# Patient Record
Sex: FEMALE | Race: WHITE | NOT HISPANIC OR LATINO | URBAN - METROPOLITAN AREA
[De-identification: names, ages, dates, MRNs, and addresses within clinical notes are randomized per-mention and may not be internally consistent; named-entity substitution may affect disease eponyms.]

---

## 2017-12-27 ENCOUNTER — OFFICE VISIT - HEALTHEAST (OUTPATIENT)
Dept: FAMILY MEDICINE | Facility: CLINIC | Age: 77
End: 2017-12-27

## 2017-12-27 ENCOUNTER — RECORDS - HEALTHEAST (OUTPATIENT)
Dept: GENERAL RADIOLOGY | Facility: CLINIC | Age: 77
End: 2017-12-27

## 2017-12-27 DIAGNOSIS — J18.9 PNEUMONIA, UNSPECIFIED ORGANISM: ICD-10-CM

## 2017-12-27 DIAGNOSIS — J18.9 CAP (COMMUNITY ACQUIRED PNEUMONIA): ICD-10-CM

## 2017-12-27 ASSESSMENT — MIFFLIN-ST. JEOR: SCORE: 1135.5

## 2018-01-04 ENCOUNTER — OFFICE VISIT - HEALTHEAST (OUTPATIENT)
Dept: FAMILY MEDICINE | Facility: CLINIC | Age: 78
End: 2018-01-04

## 2018-01-04 DIAGNOSIS — J18.9 CAP (COMMUNITY ACQUIRED PNEUMONIA): ICD-10-CM

## 2018-01-04 ASSESSMENT — MIFFLIN-ST. JEOR: SCORE: 1140.32

## 2018-01-11 ENCOUNTER — OFFICE VISIT - HEALTHEAST (OUTPATIENT)
Dept: FAMILY MEDICINE | Facility: CLINIC | Age: 78
End: 2018-01-11

## 2018-01-11 DIAGNOSIS — J18.9 COMMUNITY ACQUIRED PNEUMONIA, UNSPECIFIED LATERALITY: ICD-10-CM

## 2018-01-11 ASSESSMENT — MIFFLIN-ST. JEOR: SCORE: 1140.32

## 2019-02-13 ENCOUNTER — OFFICE VISIT - HEALTHEAST (OUTPATIENT)
Dept: FAMILY MEDICINE | Facility: CLINIC | Age: 79
End: 2019-02-13

## 2019-02-13 DIAGNOSIS — S62.101A CLOSED FRACTURE OF RIGHT WRIST, INITIAL ENCOUNTER: ICD-10-CM

## 2019-02-13 DIAGNOSIS — T14.90XA INJURY: ICD-10-CM

## 2019-02-13 RX ORDER — TRAMADOL HYDROCHLORIDE 50 MG/1
50 TABLET ORAL EVERY 6 HOURS PRN
Qty: 20 TABLET | Refills: 0 | Status: SHIPPED | OUTPATIENT
Start: 2019-02-13

## 2019-02-20 ENCOUNTER — RECORDS - HEALTHEAST (OUTPATIENT)
Dept: GENERAL RADIOLOGY | Facility: CLINIC | Age: 79
End: 2019-02-20

## 2019-02-20 ENCOUNTER — OFFICE VISIT - HEALTHEAST (OUTPATIENT)
Dept: FAMILY MEDICINE | Facility: CLINIC | Age: 79
End: 2019-02-20

## 2019-02-20 DIAGNOSIS — Z09 ENCOUNTER FOR FOLLOW-UP EXAMINATION AFTER COMPLETED TREATMENT FOR CONDITIONS OTHER THAN MALIGNANT NEOPLASM: ICD-10-CM

## 2019-02-20 DIAGNOSIS — Z09 FOLLOW UP: ICD-10-CM

## 2019-03-06 ENCOUNTER — OFFICE VISIT - HEALTHEAST (OUTPATIENT)
Dept: FAMILY MEDICINE | Facility: CLINIC | Age: 79
End: 2019-03-06

## 2019-03-06 ENCOUNTER — COMMUNICATION - HEALTHEAST (OUTPATIENT)
Dept: FAMILY MEDICINE | Facility: CLINIC | Age: 79
End: 2019-03-06

## 2019-03-06 DIAGNOSIS — Z09 FOLLOW UP: ICD-10-CM

## 2019-03-20 ENCOUNTER — OFFICE VISIT - HEALTHEAST (OUTPATIENT)
Dept: FAMILY MEDICINE | Facility: CLINIC | Age: 79
End: 2019-03-20

## 2019-03-20 DIAGNOSIS — Z09 FOLLOW UP: ICD-10-CM

## 2019-03-27 ENCOUNTER — OFFICE VISIT - HEALTHEAST (OUTPATIENT)
Dept: FAMILY MEDICINE | Facility: CLINIC | Age: 79
End: 2019-03-27

## 2019-03-27 ENCOUNTER — RECORDS - HEALTHEAST (OUTPATIENT)
Dept: GENERAL RADIOLOGY | Facility: CLINIC | Age: 79
End: 2019-03-27

## 2019-03-27 DIAGNOSIS — S62.101S FRACTURE OF UNSPECIFIED CARPAL BONE, RIGHT WRIST, SEQUELA: ICD-10-CM

## 2019-03-27 DIAGNOSIS — S62.101S CLOSED FRACTURE OF RIGHT WRIST, SEQUELA: ICD-10-CM

## 2021-05-27 NOTE — PROGRESS NOTES
Chief Complaint   Patient presents with     Cast Removal       HPI: Very pleasant 78-year-old female presents today for recheck of her fracture.  No pain noted.    ROS: No distal paresthesias.  No vascular changes.    SH:    reports that she quit smoking about 20 years ago. Her smoking use included cigarettes. She has a 60.00 pack-year smoking history. she has never used smokeless tobacco. She reports that she drinks alcohol. She reports that she does not use drugs.      FH: The Patient's family history includes Allergic rhinitis in her mother; Asthma in her mother; Cancer in her father and mother; Other in her brother and sister.     Meds:  Meggan has a current medication list which includes the following prescription(s): albuterol, cholecalciferol (vitamin d3), fluticasone propionate, ibuprofen, inhalational spacing device, loratadine, moxifloxacin, ranitidine, and tramadol, and the following Facility-Administered Medications: ceftriaxone.    O:  There were no vitals taken for this visit.  Examination shows that the cast was removed and there is normal distal neurological vascular function.  No pain to palpation over the fracture site.    Review the x-ray shows good callus formation with no change in position from previous x-rays.    A/P:   1. Closed fracture of right wrist, sequela  Fracture healing well.  We will place her in a soft splint for comfort, and she may use it minimally for the next week around the house but no heavy lifting.  She then may gradually increase activity.  Over-the-counter medicines only for pain.  - XR Wrist Right 3 or More VWS; Future

## 2021-05-31 VITALS — WEIGHT: 151.31 LBS | BODY MASS INDEX: 25.83 KG/M2 | HEIGHT: 64 IN

## 2021-05-31 VITALS — HEIGHT: 64 IN | WEIGHT: 150.25 LBS | BODY MASS INDEX: 25.65 KG/M2

## 2021-06-02 VITALS — BODY MASS INDEX: 25.72 KG/M2 | WEIGHT: 151 LBS

## 2021-06-02 VITALS — WEIGHT: 151 LBS | BODY MASS INDEX: 25.72 KG/M2

## 2021-06-15 NOTE — PROGRESS NOTES
"Chief Complaint   Patient presents with     Follow-up     still coughing but feels better       HPI: Meggan comes in today for recheck of pneumonia.  Old note from last week was reviewed in detail.  She is doing much better, and feels better.  She still has Augmentin left.    ROS: No fever vomiting or diarrhea    SH: The Patient's  reports that she quit smoking about 19 years ago. Her smoking use included Cigarettes. She has a 60.00 pack-year smoking history. She has never used smokeless tobacco. She reports that she drinks alcohol. She reports that she does not use illicit drugs.     FH: The Patient's family history includes Allergic rhinitis in her mother; Asthma in her mother; Cancer in her father and mother; Other in her brother and sister.    Meds:  Meggan has a current medication list which includes the following prescription(s): albuterol, amoxicillin-clavulanate, cholecalciferol (vitamin d3), fluticasone, ibuprofen, inhalational spacing device, loratadine, moxifloxacin, and ranitidine, and the following Facility-Administered Medications: ceftriaxone.    O:  /70  Pulse 81  Temp 98.1  F (36.7  C)  Resp 16  Ht 5' 4.25\" (1.632 m)  Wt 151 lb 5 oz (68.6 kg)  SpO2 97%  BMI 25.77 kg/m2     Lungs--Clear to Auscultation  Heart--Regular rate and rhythm  Abdomen--Soft, non-tender, non-distended  Skin-Pink and dry     A/P:   1. Community acquired pneumonia, unspecified laterality  -Resolving  -Continue Augmentin                                          "

## 2021-06-15 NOTE — PROGRESS NOTES
"Chief Complaint   Patient presents with     Follow-up     still coughing, blowing out green stuff and coughing up green stuff.        HPI: Patient presents today after being seen last week and started on Zithromax and Rocephin for pneumonia.  She feels subjectively slightly better however she notes that she continues to have cough and sputum production.  ROS: No fever.  No vomiting or diarrhea.  Socially she does not smoke.  She is attended today with her .    SH: The Patient's  reports that she quit smoking about 19 years ago. Her smoking use included Cigarettes. She has a 60.00 pack-year smoking history. She has never used smokeless tobacco. She reports that she drinks alcohol. She reports that she does not use illicit drugs.     FH: The Patient's family history includes Allergic rhinitis in her mother; Asthma in her mother; Cancer in her father and mother; Other in her brother and sister.    Meds:  Meggan has a current medication list which includes the following prescription(s): albuterol, amoxicillin-clavulanate, cholecalciferol (vitamin d3), fluticasone, ibuprofen, inhalational spacing device, loratadine, moxifloxacin, and ranitidine, and the following Facility-Administered Medications: ceftriaxone.    O:  /58  Pulse 85  Temp 97.9  F (36.6  C)  Resp 16  Ht 5' 4.25\" (1.632 m)  Wt 151 lb 5 oz (68.6 kg)  SpO2 98%  BMI 25.77 kg/m2   Constitutional:    --Vitals as above  --No acute distress  Eyes-  --Sclera noninjected  --Lids and conjunctiva normal  ENT-  --TMs clear  --Sclera noninjected  --Pharynx not erythematous  Neck-  --Neck supple with no cervical lymphadenopathy  Lungs-  --mild crackles bilaterally in the bases  Heart-  --Regular rate and rhythm  Abdomen--  --Soft, non-tender, non-distended  Skin-  --Pink and dry  Psych-  --Alert and oriented  --Normal affect      A/P:   1. CAP (community acquired pneumonia)-not improving  -Augmentin 875 twice daily 10 days  -Return to clinic 1 " week

## 2021-06-24 NOTE — PROGRESS NOTES
Chief Complaint   Patient presents with     Follow-up     R wrist fx f/u       HPI: Meggan comes in today for recheck.  Is been 3 weeks since fracture and splint/cast placement.  She has minimal pain, mild pruritus.  No distal neurological or vascular changes in her fingers.    ROS: No distal neurological or vascular changes    SH:    reports that she quit smoking about 20 years ago. Her smoking use included cigarettes. She has a 60.00 pack-year smoking history. she has never used smokeless tobacco. She reports that she drinks alcohol. She reports that she does not use drugs.      FH: The Patient's family history includes Allergic rhinitis in her mother; Asthma in her mother; Cancer in her father and mother; Other in her brother and sister.     Meds:  Meggan has a current medication list which includes the following prescription(s): albuterol, cholecalciferol (vitamin d3), fluticasone, ibuprofen, inhalational spacing device, loratadine, moxifloxacin, ranitidine, and tramadol, and the following Facility-Administered Medications: ceftriaxone.    O:  /72   Pulse 89   Resp 16   Wt 151 lb (68.5 kg)   SpO2 98%   BMI 25.72 kg/m    Examination of the right hand shows normal distal neurological vascular function.  Cast in place with no evidence of impingement  Review of the x-ray shows no evidence of change as far as alignment.  Callus formation noted.    A/P:   1. Follow up  We will see her back in 2 weeks which will be a total of 5 weeks from fracture.  We will remove cast at that time if healing going well.  - XR Wrist Right 3 or More VWS

## 2021-06-24 NOTE — TELEPHONE ENCOUNTER
Pt contacted per DR Joseph's request to move appt up to a morning slot. Pt declined. H.DeGree, CMA

## 2021-06-24 NOTE — PROGRESS NOTES
Chief Complaint   Patient presents with     Follow-up       HPI: Patient presents for follow-up.  She has been wearing her splint faithfully and has had no pain while wearing the splint.    ROS: No distal paresthesias or vascular changes    SH:    reports that she quit smoking about 20 years ago. Her smoking use included cigarettes. She has a 60.00 pack-year smoking history. she has never used smokeless tobacco. She reports that she drinks alcohol. She reports that she does not use drugs.      FH: The Patient's family history includes Allergic rhinitis in her mother; Asthma in her mother; Cancer in her father and mother; Other in her brother and sister.     Meds:  Meggan has a current medication list which includes the following prescription(s): albuterol, cholecalciferol (vitamin d3), fluticasone, ibuprofen, inhalational spacing device, loratadine, moxifloxacin, ranitidine, and tramadol, and the following Facility-Administered Medications: ceftriaxone.    O:  /68   Pulse 83   Resp 16   SpO2 96%   Examination of the right wrist shows no evidence of swelling.  No vascular compromise.  Normal distal neurological vascular function.    Review the x-ray shows no change from previous x-ray with no angulation or movement.    A/P:   1. Wrist fracture  Short arm cast applied.  Cast instructions and instructions concerning changes in neurological or vascular function were discussed.  Patient will return in 2 weeks for recheck.  - XR Wrist Right 3 or More VWS; Future

## 2021-06-24 NOTE — PROGRESS NOTES
Chief Complaint   Patient presents with     Wrist Injury     Pt c/o falling landing on R wrist today, wrist is swollen, she can  her fingers       HPI: Very pleasant 78-year-old female presents with right wrist and arm pain.  She was using a snow rake to remove snow from the top of her roof and experienced a FOOSH injury.  She presents today for evaluation.  The pain is moderate, and there is substantial swelling on the dorsal aspect of the right wrist.    ROS: No loss of conscious.  No bleeding.    SH:    reports that she quit smoking about 20 years ago. Her smoking use included cigarettes. She has a 60.00 pack-year smoking history. she has never used smokeless tobacco. She reports that she drinks alcohol. She reports that she does not use drugs.        FH: The Patient's family history includes Allergic rhinitis in her mother; Asthma in her mother; Cancer in her father and mother; Other in her brother and sister.       Meds:    Meggan has a current medication list which includes the following prescription(s): albuterol, cholecalciferol (vitamin d3), fluticasone, ibuprofen, inhalational spacing device, loratadine, moxifloxacin, and ranitidine, and the following Facility-Administered Medications: ceftriaxone.    O:  /64   Pulse 81   Resp 16   SpO2 99%   Examination of the right upper extremity shows swelling on the dorsal aspect of the right wrist.  Normal distal neurological and vascular function of the right upper extremity with 3-4+ distal pulses.  No break in the skin.    X-ray-reviewed the x-rays show a nondisplaced transverse fracture of the distal radius and angular fracture of the ulnar styloid.  The articular surface is not affected.    A/P:   1. Injury-FOOSH  - XR Wrist Right 3 or More VWS  - traMADol (ULTRAM) 50 mg tablet; Take 1 tablet (50 mg total) by mouth every 6 (six) hours as needed for pain.  Dispense: 8 tablet; Refill: 0    2. Closed fracture of right wrist, initial encounter  The  patient has a ulnar styloid and distal radius fracture that is nondisplaced.  We discussed referral to orthopedics versus office management.  Patient would like to have office management for now.  Volar splint applied in the neutral wrist position and wrapped with Coban.  Sling placed for comfort.  Ultram for pain.  Follow-up in 1 week.

## 2021-06-25 NOTE — PROGRESS NOTES
Wrist fracture      HPI: Patient presents today for recheck.  She is tolerating the cast well and has no pain in her hands    ROS: No distal neurological or vascular dysfunction numbness or tingling    SH:    reports that she quit smoking about 5 years ago. she has never used smokeless tobacco. She reports that she does not drink alcohol.      FH: The Patient's family history includes Cancer in her sister; Cervical cancer in her sister; Coronary artery disease in her father; Depression in her mother; Diabetes in her father; Emphysema in her father; Heart attack in her father; Heart disease in her father; Hydrocephalus in her brother; Kidney disease in her mother; Mental retardation in her brother; Snoring in her father.     Meds:  Ashley has a current medication list which includes the following prescription(s): acetaminophen-codeine, asenapine, atorvastatin, bupropion, lamotrigine, linaclotide, omeprazole, ondansetron, quetiapine, quetiapine, sennosides, sertraline, trazodone, varenicline, varenicline, and vitamin d3.    O:  There were no vitals taken for this visit.  Semination shows normal distal neurological vascular function of the left upper extremity.    Review the x-ray shows good callus formation through the cast with no evidence of displacement or change in the fracture    A/P:   Wrist fracture  -Healing well  - We will see back in 7 days for recheck and probable cast removal and repeat x-ray.

## 2025-04-07 ENCOUNTER — LAB REQUISITION (OUTPATIENT)
Dept: LAB | Facility: CLINIC | Age: 85
End: 2025-04-07
Payer: MEDICARE

## 2025-04-07 DIAGNOSIS — L89.150 PRESSURE ULCER OF SACRAL REGION, UNSTAGEABLE (H): ICD-10-CM

## 2025-04-07 PROCEDURE — 87077 CULTURE AEROBIC IDENTIFY: CPT | Mod: ORL | Performed by: NURSE PRACTITIONER

## 2025-04-10 LAB
BACTERIA TISS BX CULT: ABNORMAL

## 2025-04-23 ENCOUNTER — LAB REQUISITION (OUTPATIENT)
Dept: LAB | Facility: CLINIC | Age: 85
End: 2025-04-23
Payer: MEDICARE

## 2025-04-23 LAB
ALBUMIN UR-MCNC: NEGATIVE MG/DL
APPEARANCE UR: CLEAR
BILIRUB UR QL STRIP: NEGATIVE
COLOR UR AUTO: ABNORMAL
GLUCOSE UR STRIP-MCNC: NEGATIVE MG/DL
HGB UR QL STRIP: NEGATIVE
HYALINE CASTS: 3 /LPF
KETONES UR STRIP-MCNC: NEGATIVE MG/DL
LEUKOCYTE ESTERASE UR QL STRIP: NEGATIVE
MUCOUS THREADS #/AREA URNS LPF: PRESENT /LPF
NITRATE UR QL: NEGATIVE
PH UR STRIP: 7 [PH] (ref 5–7)
RBC URINE: 1 /HPF
SP GR UR STRIP: 1.01 (ref 1–1.03)
SQUAMOUS EPITHELIAL: 3 /HPF
UROBILINOGEN UR STRIP-MCNC: NORMAL MG/DL
WBC URINE: 4 /HPF

## 2025-04-23 PROCEDURE — 81001 URINALYSIS AUTO W/SCOPE: CPT | Mod: ORL | Performed by: NURSE PRACTITIONER
